# Patient Record
Sex: FEMALE | Race: WHITE
[De-identification: names, ages, dates, MRNs, and addresses within clinical notes are randomized per-mention and may not be internally consistent; named-entity substitution may affect disease eponyms.]

---

## 2017-10-10 ENCOUNTER — HOSPITAL ENCOUNTER (EMERGENCY)
Dept: HOSPITAL 62 - ER | Age: 27
Discharge: HOME | End: 2017-10-10
Payer: SELF-PAY

## 2017-10-10 VITALS — SYSTOLIC BLOOD PRESSURE: 120 MMHG | DIASTOLIC BLOOD PRESSURE: 83 MMHG

## 2017-10-10 DIAGNOSIS — F17.200: ICD-10-CM

## 2017-10-10 DIAGNOSIS — R06.2: Primary | ICD-10-CM

## 2017-10-10 DIAGNOSIS — R06.02: ICD-10-CM

## 2017-10-10 LAB
ALBUMIN SERPL-MCNC: 4.5 G/DL (ref 3.5–5)
ALP SERPL-CCNC: 82 U/L (ref 38–126)
ALT SERPL-CCNC: 27 U/L (ref 9–52)
ANION GAP SERPL CALC-SCNC: 13 MMOL/L (ref 5–19)
AST SERPL-CCNC: 18 U/L (ref 14–36)
BASOPHILS # BLD AUTO: 0.2 10^3/UL (ref 0–0.2)
BASOPHILS NFR BLD AUTO: 1.2 % (ref 0–2)
BILIRUB DIRECT SERPL-MCNC: 0.4 MG/DL (ref 0–0.4)
BILIRUB SERPL-MCNC: 0.6 MG/DL (ref 0.2–1.3)
BUN SERPL-MCNC: 10 MG/DL (ref 7–20)
CALCIUM: 9.9 MG/DL (ref 8.4–10.2)
CHLORIDE SERPL-SCNC: 106 MMOL/L (ref 98–107)
CO2 SERPL-SCNC: 24 MMOL/L (ref 22–30)
CREAT SERPL-MCNC: 0.72 MG/DL (ref 0.52–1.25)
EOSINOPHIL # BLD AUTO: 0.6 10^3/UL (ref 0–0.6)
EOSINOPHIL NFR BLD AUTO: 3.9 % (ref 0–6)
ERYTHROCYTE [DISTWIDTH] IN BLOOD BY AUTOMATED COUNT: 16.1 % (ref 11.5–14)
GLUCOSE SERPL-MCNC: 101 MG/DL (ref 75–110)
HCT VFR BLD CALC: 39.8 % (ref 36–47)
HGB BLD-MCNC: 13.4 G/DL (ref 12–15.5)
HGB HCT DIFFERENCE: 0.4
LYMPHOCYTES # BLD AUTO: 1.8 10^3/UL (ref 0.5–4.7)
LYMPHOCYTES NFR BLD AUTO: 12.3 % (ref 13–45)
MCH RBC QN AUTO: 26.7 PG (ref 27–33.4)
MCHC RBC AUTO-ENTMCNC: 33.8 G/DL (ref 32–36)
MCV RBC AUTO: 79 FL (ref 80–97)
MONOCYTES # BLD AUTO: 0.5 10^3/UL (ref 0.1–1.4)
MONOCYTES NFR BLD AUTO: 3.8 % (ref 3–13)
NEUTROPHILS # BLD AUTO: 11.5 10^3/UL (ref 1.7–8.2)
NEUTS SEG NFR BLD AUTO: 78.8 % (ref 42–78)
POTASSIUM SERPL-SCNC: 4.5 MMOL/L (ref 3.6–5)
PROT SERPL-MCNC: 7.4 G/DL (ref 6.3–8.2)
RBC # BLD AUTO: 5.02 10^6/UL (ref 3.72–5.28)
SODIUM SERPL-SCNC: 143 MMOL/L (ref 137–145)
WBC # BLD AUTO: 14.6 10^3/UL (ref 4–10.5)

## 2017-10-10 PROCEDURE — 36415 COLL VENOUS BLD VENIPUNCTURE: CPT

## 2017-10-10 PROCEDURE — 99285 EMERGENCY DEPT VISIT HI MDM: CPT

## 2017-10-10 PROCEDURE — 96374 THER/PROPH/DIAG INJ IV PUSH: CPT

## 2017-10-10 PROCEDURE — 71010: CPT

## 2017-10-10 PROCEDURE — 85025 COMPLETE CBC W/AUTO DIFF WBC: CPT

## 2017-10-10 PROCEDURE — 94640 AIRWAY INHALATION TREATMENT: CPT

## 2017-10-10 PROCEDURE — 96361 HYDRATE IV INFUSION ADD-ON: CPT

## 2017-10-10 PROCEDURE — 80053 COMPREHEN METABOLIC PANEL: CPT

## 2017-10-10 NOTE — RADIOLOGY REPORT (SQ)
EXAM DESCRIPTION:  CHEST SINGLE VIEW



COMPLETED DATE/TIME:  10/10/2017 3:04 pm



REASON FOR STUDY:  SOB, cough



COMPARISON:  None.



EXAM PARAMETERS:  NUMBER OF VIEWS: One view.

TECHNIQUE: Single frontal radiographic view of the chest acquired.

RADIATION DOSE: NA

LIMITATIONS: Large patient, portable technique



FINDINGS:  LUNGS AND PLEURA: No opacities, masses or pneumothorax. No pleural effusion.

MEDIASTINUM AND HILAR STRUCTURES: No masses.  Contour normal.

HEART AND VASCULAR STRUCTURES: No cardiomegaly.  Normal vasculature.

BONES: No acute findings.

HARDWARE: None in the chest.

OTHER: No other significant finding.



IMPRESSION:  NO ACUTE RADIOGRAPHIC FINDING IN THE CHEST.



TECHNICAL DOCUMENTATION:  JOB ID:  [id number]

## 2017-10-10 NOTE — ER DOCUMENT REPORT
ED General





- General


Chief Complaint: Breathing Difficulty


Stated Complaint: BREATHING DIFFICULTY


Time Seen by Provider: 10/10/17 14:16


TRAVEL OUTSIDE OF THE [place] IN LAST 30 DAYS: No





- HPI


Patient complains to provider of: Short of breath


Notes: 





Patient coming in for acute shortness of breath.  Patient states happened 

before with collapsed lung sent home with steroids and oxygen.  Patient states 

does not have any surgery patient does admit to smoking having cold-like 

symptoms cough runny nose.  Patient denies any recent travel denies any hormone 

replacement patient denies fever chills nausea vomiting.  Patient is resting 

comfortably upon my evaluation already received a nebulizer treatment stating 

that she feels much better.





- Related Data


Allergies/Adverse Reactions: 


 





No Known Allergies Allergy (Unverified 10/10/17 14:21)


 











Past Medical History





- General


Information source: Patient





- Social History


Smoking Status: Current Every Day Smoker


Cigarette use (# per day): Yes


Frequency of alcohol use: Rare


Family History: Reviewed & Not Pertinent


Pulmonary Medical History: Reports: Hx Asthma


Renal/ Medical History: Denies: Hx Peritoneal Dialysis





Review of Systems





- Review of Systems


Constitutional: No symptoms reported


EENT: No symptoms reported


Cardiovascular: No symptoms reported


Respiratory: Short of breath


Gastrointestinal: No symptoms reported


Genitourinary: No symptoms reported


Female Genitourinary: No symptoms reported


Musculoskeletal: No symptoms reported


Skin: No symptoms reported


Hematologic/Lymphatic: No symptoms reported


Neurological/Psychological: No symptoms reported





Physical Exam





- Vital signs


Vitals: 


 











Temp Pulse Resp Pulse Ox


 


 98.6 F   111 H  28 H  91 L


 


 10/10/17 14:09  10/10/17 14:09  10/10/17 14:09  10/10/17 14:09











Interpretation: Normal





- General


General appearance: Appears well, Alert





- HEENT


Head: Normocephalic, Atraumatic


Eyes: Normal


Pupils: PERRL





- Respiratory


Respiratory status: No respiratory distress


Chest status: Nontender


Breath sounds: Wheezing


Chest palpation: Normal





- Cardiovascular


Rhythm: Regular


Heart sounds: Normal auscultation


Murmur: No





- Abdominal


Inspection: Normal


Distension: No distension


Bowel sounds: Normal


Tenderness: Nontender


Organomegaly: No organomegaly





- Back


Back: Normal, Nontender





- Extremities


General upper extremity: Normal inspection, Nontender, Normal color, Normal ROM

, Normal temperature


General lower extremity: Normal inspection, Nontender, Normal color, Normal ROM

, Normal temperature, Normal weight bearing.  No: Cricket's sign





- Neurological


Neuro grossly intact: Yes


Cognition: Normal


Orientation: AAOx4


Turner Coma Scale Eye Opening: Spontaneous


Turner Coma Scale Verbal: Oriented


Dayanna Coma Scale Motor: Obeys Commands


Dayanna Coma Scale Total: 15


Speech: Normal


Motor strength normal: LUE, RUE, LLE, RLE


Sensory: Normal





- Psychological


Associated symptoms: Normal affect, Normal mood





- Skin


Skin Temperature: Warm


Skin Moisture: Dry


Skin Color: Normal





Course





- Re-evaluation


Re-evalutation: 





10/10/17 18:32


Reexaminations showed resolution of wheezing.  Patient was given prednisone.  

Laboratory studies x-ray does not show any critical findings.  Patient will be 

discharged on follow-up primary care physicians.





- Vital Signs


Vital signs: 


 











Temp Pulse Resp BP Pulse Ox


 


 98.6 F   111 H  21 H  120/83   92 


 


 10/10/17 14:09  10/10/17 14:09  10/10/17 16:36  10/10/17 16:36  10/10/17 16:36














- Laboratory


Result Diagrams: 


 10/10/17 14:34





 10/10/17 14:34


Laboratory results interpreted by me: 


 











  10/10/17





  14:34


 


WBC  14.6 H


 


MCV  79 L


 


MCH  26.7 L


 


RDW  16.1 H


 


Seg Neutrophils %  78.8 H


 


Lymphocytes %  12.3 L


 


Absolute Neutrophils  11.5 H














Discharge





- Discharge


Clinical Impression: 


 Wheezing, Cigarette smoker





Condition: Good


Disposition: HOME, SELF-CARE


Instructions:  Bronchitis With Bronchospasm (Wheezing) ([facility]), Bronchodilators (

[facility]), Family Physicians / Practices, Stop Smoking ([facility])


Additional Instructions: 


Follow-up with your primary care physician.  Return to the ER if symptoms 

worsen.  Take medications as prescribed.








Prescriptions: 


Prednisone [Deltasone] 60 mg PO DAILY #24 tablet


Forms:  Return to Work

## 2017-10-10 NOTE — ER DOCUMENT REPORT
ED Medical Screen (RME)





- General


Chief Complaint: Breathing Difficulty


Stated Complaint: BREATHING DIFFICULTY


Time Seen by Provider: 10/10/17 14:16


Notes: 





27-year-old female complaining of extreme shortness of breath starting this 

morning associated with chest tightness but no chest pain.  Complains of 

productive cough but denies any fever.  Denies any history of asthma or COPD 

although she does admit to smoking.  States that she has had similar symptoms 

once before when she had the "bottom lobes of her lungs collapsed".  Patient 

did not have to have surgery for this, was treated with steroids and oxygen at 

home.


TRAVEL OUTSIDE OF THE [place] IN LAST 30 DAYS: No





Past Medical History





- General


Information source: Patient





- Social History


Cigarette use (# per day): Yes


Frequency of alcohol use: Rare


Pulmonary Medical History: Reports: Hx Asthma


Renal/ Medical History: Denies: Hx Peritoneal Dialysis





Review of Systems





- Review of Systems


Respiratory: See HPI





Physical Exam





- Vital signs


Vitals: 





 











Temp Pulse Resp Pulse Ox


 


 98.6 F   111 H  28 H  91 L


 


 10/10/17 14:09  10/10/17 14:09  10/10/17 14:09  10/10/17 14:09











Interpretation: Tachycardic, Hypoxic





- Respiratory


Respiratory status: Respiratory distress, Pursed lip breathing


Chest status: Accessory muscle use, Prolonged expirations


Breath sounds: Wheezing





Course





- Vital Signs


Vital signs: 





 











Temp Pulse Resp BP Pulse Ox


 


 98.6 F   111 H  28 H     91 L


 


 10/10/17 14:09  10/10/17 14:09  10/10/17 14:09     10/10/17 14:09

## 2018-03-18 ENCOUNTER — HOSPITAL ENCOUNTER (EMERGENCY)
Dept: HOSPITAL 62 - ER | Age: 28
Discharge: HOME | End: 2018-03-18
Payer: SELF-PAY

## 2018-03-18 VITALS — SYSTOLIC BLOOD PRESSURE: 140 MMHG | DIASTOLIC BLOOD PRESSURE: 73 MMHG

## 2018-03-18 DIAGNOSIS — R00.0: ICD-10-CM

## 2018-03-18 DIAGNOSIS — J45.998: Primary | ICD-10-CM

## 2018-03-18 DIAGNOSIS — R07.89: ICD-10-CM

## 2018-03-18 DIAGNOSIS — R06.02: ICD-10-CM

## 2018-03-18 DIAGNOSIS — R55: ICD-10-CM

## 2018-03-18 LAB
ADD MANUAL DIFF: NO
ANION GAP SERPL CALC-SCNC: 15 MMOL/L (ref 5–19)
BASOPHILS # BLD AUTO: 0.1 10^3/UL (ref 0–0.2)
BASOPHILS NFR BLD AUTO: 0.4 % (ref 0–2)
BUN SERPL-MCNC: 11 MG/DL (ref 7–20)
CALCIUM: 9.7 MG/DL (ref 8.4–10.2)
CHLORIDE SERPL-SCNC: 107 MMOL/L (ref 98–107)
CO2 SERPL-SCNC: 20 MMOL/L (ref 22–30)
EOSINOPHIL # BLD AUTO: 0.2 10^3/UL (ref 0–0.6)
EOSINOPHIL NFR BLD AUTO: 1 % (ref 0–6)
ERYTHROCYTE [DISTWIDTH] IN BLOOD BY AUTOMATED COUNT: 15.3 % (ref 11.5–14)
GLUCOSE SERPL-MCNC: 113 MG/DL (ref 75–110)
HCT VFR BLD CALC: 38 % (ref 36–47)
HGB BLD-MCNC: 12.6 G/DL (ref 12–15.5)
LYMPHOCYTES # BLD AUTO: 1.1 10^3/UL (ref 0.5–4.7)
LYMPHOCYTES NFR BLD AUTO: 6.9 % (ref 13–45)
MCH RBC QN AUTO: 26.5 PG (ref 27–33.4)
MCHC RBC AUTO-ENTMCNC: 33.1 G/DL (ref 32–36)
MCV RBC AUTO: 80 FL (ref 80–97)
MONOCYTES # BLD AUTO: 0.4 10^3/UL (ref 0.1–1.4)
MONOCYTES NFR BLD AUTO: 2.6 % (ref 3–13)
NEUTROPHILS # BLD AUTO: 14.7 10^3/UL (ref 1.7–8.2)
NEUTS SEG NFR BLD AUTO: 89.1 % (ref 42–78)
NT PRO BNP: 25 PG/ML (ref ?–125)
PLATELET # BLD: 296 10^3/UL (ref 150–450)
POTASSIUM SERPL-SCNC: 4.1 MMOL/L (ref 3.6–5)
RBC # BLD AUTO: 4.74 10^6/UL (ref 3.72–5.28)
SODIUM SERPL-SCNC: 141.8 MMOL/L (ref 137–145)
TOTAL CELLS COUNTED % (AUTO): 100 %
TROPONIN I SERPL-MCNC: < 0.012 NG/ML
WBC # BLD AUTO: 16.5 10^3/UL (ref 4–10.5)

## 2018-03-18 PROCEDURE — 94640 AIRWAY INHALATION TREATMENT: CPT

## 2018-03-18 PROCEDURE — 83880 ASSAY OF NATRIURETIC PEPTIDE: CPT

## 2018-03-18 PROCEDURE — 85025 COMPLETE CBC W/AUTO DIFF WBC: CPT

## 2018-03-18 PROCEDURE — 96360 HYDRATION IV INFUSION INIT: CPT

## 2018-03-18 PROCEDURE — 99285 EMERGENCY DEPT VISIT HI MDM: CPT

## 2018-03-18 PROCEDURE — 80048 BASIC METABOLIC PNL TOTAL CA: CPT

## 2018-03-18 PROCEDURE — 93005 ELECTROCARDIOGRAM TRACING: CPT

## 2018-03-18 PROCEDURE — 93010 ELECTROCARDIOGRAM REPORT: CPT

## 2018-03-18 PROCEDURE — 85379 FIBRIN DEGRADATION QUANT: CPT

## 2018-03-18 PROCEDURE — 84484 ASSAY OF TROPONIN QUANT: CPT

## 2018-03-18 PROCEDURE — 36415 COLL VENOUS BLD VENIPUNCTURE: CPT

## 2018-03-18 PROCEDURE — 71045 X-RAY EXAM CHEST 1 VIEW: CPT

## 2018-03-18 NOTE — EKG REPORT
SEVERITY:- BORDERLINE ECG -

SINUS TACHYCARDIA

PROBABLE LEFT ATRIAL ABNORMALITY

:

Confirmed by: Mark Anthony Massey MD 18-Mar-2018 20:16:37

## 2018-03-18 NOTE — ER DOCUMENT REPORT
ED General





- General


Chief Complaint: Breathing Difficulty


Stated Complaint: SHORTNESS OF BREATH


Time Seen by Provider: 03/18/18 17:11


Notes: 





Patient is a 27-year-old female with a past history of recurrent bronchospasms 

who presents with the same.  Patient reports that she has had a viral upper 

respiratory infection over the past several days and that over the last 24 

hours she has had progressively worsening shortness of breath, wheezing and 

cough.  She states this feels very similar to the 2 prior occasions in which 

she has been diagnosed as having severe bronchospasms on one such occasion she 

required hospitalization with supplemental oxygen at time of discharge.  She 

has never followed up with pulmonology for formal pulmonary function testings 

or further evaluation of the cause of this recurrent illness.  She states that 

she has tried an inhaler at home without any improvement of her symptoms.  

Exertion seems to worsen her shortness of breath.  She denies any hemoptysis, 

syncope, abdominal pain, nausea, vomiting or fever.  She denies any pleuritic 

pain.  No history of DVT or pulmonary embolus.


TRAVEL OUTSIDE OF THE [place] IN LAST 30 DAYS: No





- Related Data


Allergies/Adverse Reactions: 


 





No Known Allergies Allergy (Verified 03/18/18 17:01)


 











Past Medical History





- General


Information source: Patient





- Social History


Smoking Status: Former Smoker


Frequency of alcohol use: None


Drug Abuse: None


Lives with: Family


Family History: Reviewed & Not Pertinent


Patient has suicidal ideation: No


Patient has homicidal ideation: No


Pulmonary Medical History: Reports: Hx Asthma


Renal/ Medical History: Denies: Hx Peritoneal Dialysis





Review of Systems





- Review of Systems


Notes: 





Constitutional: Negative for fever.


HENT: Negative for sore throat.


Eyes: Negative for visual changes.


Cardiovascular: Negative for chest pain.


Respiratory: Positive for shortness of breath.


Gastrointestinal: Negative for abdominal pain, vomiting or diarrhea.


Genitourinary: Negative for dysuria.


Musculoskeletal: Negative for back pain.


Skin: Negative for rash.


Neurological: Negative for headaches, weakness or numbness.





10 point ROS negative except as marked above and in HPI.





Physical Exam





- Vital signs


Vitals: 


 











Temp Pulse Resp BP Pulse Ox


 


 97.7 F   108 H  22 H  155/94 H  93 


 


 03/18/18 17:04  03/18/18 17:04  03/18/18 17:04  03/18/18 17:04  03/18/18 17:04











Interpretation: Tachycardic, Tachypneic


Notes: 





PHYSICAL EXAMINATION:





GENERAL: Well-appearing, well-nourished and in no acute distress.





HEAD: Atraumatic, normocephalic.





EYES: Pupils equal round and reactive to light, extraocular movements intact, 

sclera anicteric, conjunctiva are normal.





ENT: nares patent, oropharynx clear without exudates.  Moist mucous membranes.





NECK: Normal range of motion, supple without lymphadenopathy





LUNGS: Mild tachypnea, mildly diminished air movement throughout, no wheezing 

or rhonchi.





HEART: Regular tachycardia without murmurs





ABDOMEN: Soft, nontender, normoactive bowel sounds.  No guarding, no rebound.  

No masses appreciated.





EXTREMITIES: Normal range of motion, no pitting or edema.  No cyanosis.





NEUROLOGICAL: No focal neurological deficits. Moves all extremities 

spontaneously and on command.





PSYCH: Normal mood, normal affect.





SKIN: Warm, Dry, normal turgor, no rashes or lesions noted.





Course





- Re-evaluation


Re-evalutation: 





03/18/18 19:15


Patient presents with 12 hours of worsening shortness of breath, chest tightness

, and near syncope. Patient reports two prior episodes of the same but was told 

she did not have a formal diagnosis of asthma. On my assessment, patient has 

moderately tight air movement but no wheezing although she has already had 

several breathing treatments prior to my assessment.  However, despite the lack 

of active wheezing the patient does persist with moderate tachypnea and 

borderline hypoxia resting between 93-92% and some degree of obvious labored 

breathing.  Patient reports that she was hospitalized on one prior occasion for 

the same, had a CT of her chest which is normal, and eventually was discharged 

home on steroids as well as home oxygen therapy.  Will proceed with labs, EKG, 

continuous cardiac monitoring, hold on additional breathing treatments at this 

time as the patient is markedly tachycardic and does not appear to have any 

ongoing wheezes and reassess the patient.


03/18/18 21:29


D-dimer is negative.  Chest x-ray is read as clear.  BNP and troponin are 

likewise normal.  Patient has had marked improvement of her work of breathing 

currently saturating 96-97% on room air and in no distress.  She remains 

moderately tachycardic which is anticipated given that she has received 20 mg 

of albuterol here in the emergency department.  She overall states she feels 

markedly improved.  I have encouraged her to follow-up with pulmonology and 

provided her referral to Dr. Manzanares.  At this time will discharge with return 

precautions and follow-up recommendations.  Verbal discharge instructions given 

a the bedside and opportunity for questions given. Medication warnings 

reviewed. Patient is in agreement with this plan and has verbalized 

understanding of return precautions and the need for primary care follow-up in 

the next 24-72 hours.





- Vital Signs


Vital signs: 


 











Temp Pulse Resp BP Pulse Ox


 


 97.8 F   108 H  16   140/73 H  97 


 


 03/18/18 21:52  03/18/18 17:04  03/18/18 21:52  03/18/18 21:52  03/18/18 21:52














- Laboratory


Result Diagrams: 


 03/18/18 19:55





 03/18/18 19:55


Laboratory results interpreted by me: 


 











  03/18/18 03/18/18





  19:55 19:55


 


WBC  16.5 H 


 


MCH  26.5 L 


 


RDW  15.3 H 


 


Seg Neutrophils %  89.1 H 


 


Lymphocytes %  6.9 L 


 


Monocytes %  2.6 L 


 


Absolute Neutrophils  14.7 H 


 


Carbon Dioxide   20 L


 


Glucose   113 H














- Diagnostic Test


Radiology reviewed: Image reviewed, Reports reviewed


Radiology results interpreted by me: 





03/18/18 19:18


Chest x-ray: No acute infiltrate or pneumothorax





- EKG Interpretation by Me


Additional EKG results interpreted by me: 





03/18/18 21:30


Sinus tachycardia.  Rate 123.  No ST elevations or depressions.  QTC is 447.





Discharge





- Discharge


Clinical Impression: 


 Reactive airway disease that is not asthma, Shortness of breath, Tachycardia





Condition: Good


Disposition: HOME, SELF-CARE


Additional Instructions: 


He was seen for shortness of breath today that appears to be some form of 

reactive airway disease.  Your labs and chest x-ray do not suggest a blood clot 

in your lung, cardiac failure, pneumonia, or collapsed lung.  Your symptoms 

improved with treatment here in the emergency department.  However, it is very 

important that you return to the emergency department immediately if you began 

to have worsening difficulty breathing that does not respond to your normal 

home breathing treatments.  You are also being sent home on a five-day course 

of steroids that you should start taking tomorrow.  Please also follow closely 

with your primary care physician.  you should also return to emergency 

department if you develop fever greater than 101, persistent cough, persistent 

vomiting, pass out, or any other symptoms that are concerning to you.


Prescriptions: 


Albuterol Sulfate [Proair HFA Inhalation Aerosol 8.5 gm MDI] 2 puff IH Q4H PRN #

1 mdi


 PRN Reason: 


Prednisone [Deltasone 20 mg Tablet] 3 tab PO DAILY 5 Days  tablet


Referrals: 


MONICA MANZANARES MD [ACTIVE STAFF] - Follow up as needed

## 2018-03-18 NOTE — ER DOCUMENT REPORT
ED Medical Screen (RME)





- General


Chief Complaint: Breathing Difficulty


Stated Complaint: SHORTNESS OF BREATH


Time Seen by Provider: 03/18/18 17:11


Notes: 





Patient presents for shortness of breath and wheezing.  Patient states that she 

has an albuterol puffer she uses and has had to use approximately 30 puffs 

today.  In triage patient's O2 saturation is approximately 93% and she is 

mildly tachycardic.  She has been having upper respiratory congestion for the 

last 5 days but no recent fevers or otherwise no other illnesses.  She has 

audible wheezing and prolonged end expiratory breath sounds in triage.  Hour-

long DuoNeb and steroids ordered.





I have greeted and performed a rapid initial assessment of this patient.  A 

comprehensive ED assessment and evaluation of the patient, analysis of test 

results and completion of the medical decision making process will be conducted 

by additional ED providers.





PHYSICAL EXAMINATION:





GENERAL: Well-appearing, well-nourished and in no acute distress.





HEAD: Atraumatic, normocephalic.





EYES: Pupils equal round extraocular movements intact,  conjunctiva are normal.





ENT: Nares patent





NECK: Normal range of motion





LUNGS: Wheezing with prolonged expiratory breath sounds with no crackles





Musculoskeletal: Normal range of motion





NEUROLOGICAL:  Normal speech, normal gait. 





PSYCH: Normal mood, normal affect.





SKIN: Warm, Dry, normal turgor, no rashes or lesions noted.


TRAVEL OUTSIDE OF THE [place] IN LAST 30 DAYS: No





- Related Data


Allergies/Adverse Reactions: 


 





No Known Allergies Allergy (Verified 03/18/18 17:01)


 











Past Medical History


Pulmonary Medical History: Reports: Hx Asthma


Renal/ Medical History: Denies: Hx Peritoneal Dialysis





Physical Exam





- Vital signs


Vitals: 





 











Temp Pulse Resp BP Pulse Ox


 


 97.7 F   108 H  22 H  155/94 H  93 


 


 03/18/18 17:04  03/18/18 17:04  03/18/18 17:04  03/18/18 17:04  03/18/18 17:04














Course





- Vital Signs


Vital signs: 





 











Temp Pulse Resp BP Pulse Ox


 


 97.7 F   108 H  22 H  155/94 H  93 


 


 03/18/18 17:04  03/18/18 17:04  03/18/18 17:04  03/18/18 17:04  03/18/18 17:04

## 2018-03-18 NOTE — RADIOLOGY REPORT (SQ)
EXAM DESCRIPTION:  CHEST SINGLE VIEW



COMPLETED DATE/TIME:  3/18/2018 7:06 pm



REASON FOR STUDY:  sob



COMPARISON:  Chest x-ray 10/10/2017.



EXAM PARAMETERS:  NUMBER OF VIEWS: One view.

TECHNIQUE: Single frontal radiographic view of the chest acquired.

RADIATION DOSE: NA

LIMITATIONS: None.



FINDINGS:  LUNGS AND PLEURA: No consolidation, pneumothorax or pleural effusion.

MEDIASTINUM AND HILAR STRUCTURES: No masses.  Contour normal.

HEART AND VASCULAR STRUCTURES: Heart normal in size.  Normal vasculature.

BONES: No acute findings.

HARDWARE: None in the chest.



IMPRESSION:  No acute radiographic finding in the chest.



TECHNICAL DOCUMENTATION:  JOB ID:  [id number]

[id number]

 2011 [employer]- All Rights Reserved



Reading location - IP/workstation name: [email or web address]

## 2019-11-11 ENCOUNTER — HOSPITAL ENCOUNTER (OUTPATIENT)
Dept: HOSPITAL 62 - RAD | Age: 29
End: 2019-11-11
Attending: NURSE PRACTITIONER
Payer: COMMERCIAL

## 2019-11-11 DIAGNOSIS — R06.2: Primary | ICD-10-CM

## 2019-11-11 PROCEDURE — 71046 X-RAY EXAM CHEST 2 VIEWS: CPT

## 2019-11-11 NOTE — RADIOLOGY REPORT (SQ)
EXAM DESCRIPTION:  CHEST 2 VIEWS



COMPLETED DATE/TIME:  11/11/2019 6:03 pm



REASON FOR STUDY:  R06.2 WHEEZING R06.2  WHEEZING



COMPARISON:  None.



NUMBER OF VIEWS:  Two view.



TECHNIQUE:  Frontal and lateral radiographic views of the chest acquired.



LIMITATIONS:  None.



FINDINGS:  LUNGS AND PLEURA: Peribronchial cuffing and interstitial changes.  No consolidation, effus
ion, or pneumothorax.

MEDIASTINUM AND HILAR STRUCTURES: No masses.  No contour abnormalities.

HEART AND VASCULAR STRUCTURES: Heart normal in size and contour.  No evidence for failure.

BONES: No acute findings.

HARDWARE: None in the chest.

OTHER: No other significant finding.



IMPRESSION:  REACTIVE AIRWAY DISEASE VERSUS VIRAL SYNDROME.  NO CONSOLIDATION.



TECHNICAL DOCUMENTATION:  JOB ID:  [id number]

[id number]

 2011 [employer]- All Rights Reserved



Reading location - IP/workstation name: [email or web address]